# Patient Record
Sex: MALE | URBAN - METROPOLITAN AREA
[De-identification: names, ages, dates, MRNs, and addresses within clinical notes are randomized per-mention and may not be internally consistent; named-entity substitution may affect disease eponyms.]

---

## 2024-08-09 ENCOUNTER — APPOINTMENT (RX ONLY)
Dept: URBAN - METROPOLITAN AREA CLINIC 42 | Facility: CLINIC | Age: 29
Setting detail: DERMATOLOGY
End: 2024-08-09

## 2024-08-09 DIAGNOSIS — L30.4 ERYTHEMA INTERTRIGO: ICD-10-CM | Status: INADEQUATELY CONTROLLED

## 2024-08-09 DIAGNOSIS — B08.1 MOLLUSCUM CONTAGIOSUM: ICD-10-CM

## 2024-08-09 DIAGNOSIS — R23.8 OTHER SKIN CHANGES: ICD-10-CM

## 2024-08-09 DIAGNOSIS — L82.0 INFLAMED SEBORRHEIC KERATOSIS: ICD-10-CM

## 2024-08-09 PROCEDURE — 99203 OFFICE O/P NEW LOW 30 MIN: CPT | Mod: 25

## 2024-08-09 PROCEDURE — ? COUNSELING

## 2024-08-09 PROCEDURE — ? PATIENT SPECIFIC COUNSELING

## 2024-08-09 PROCEDURE — ? OTC TREATMENT REGIMEN

## 2024-08-09 PROCEDURE — 17110 DESTRUCTION B9 LES UP TO 14: CPT

## 2024-08-09 PROCEDURE — ? LIQUID NITROGEN

## 2024-08-09 ASSESSMENT — LOCATION DETAILED DESCRIPTION DERM
LOCATION DETAILED: SUPRAPUBIC SKIN
LOCATION DETAILED: LEFT LATERAL BASE OF DORSAL PENILE SHAFT
LOCATION DETAILED: LEFT LATERAL MID-DORSAL PENILE SHAFT
LOCATION DETAILED: BASE OF DORSAL PENILE SHAFT
LOCATION DETAILED: RIGHT LATERAL BASE OF DORSAL PENILE SHAFT
LOCATION DETAILED: RIGHT LATERAL TEMPLE
LOCATION DETAILED: RIGHT LATERAL MID-DORSAL PENILE SHAFT

## 2024-08-09 ASSESSMENT — BSA RASH: BSA RASH: 5

## 2024-08-09 ASSESSMENT — TOTAL NUMBER OF MOLLUSCUM CONAGIOSUM: # OF LESIONS?: 12

## 2024-08-09 ASSESSMENT — LOCATION SIMPLE DESCRIPTION DERM
LOCATION SIMPLE: RIGHT TEMPLE
LOCATION SIMPLE: GROIN
LOCATION SIMPLE: LEFT PENIS
LOCATION SIMPLE: DORSAL PENIS

## 2024-08-09 ASSESSMENT — LOCATION ZONE DERM
LOCATION ZONE: TRUNK
LOCATION ZONE: PENIS
LOCATION ZONE: FACE

## 2024-08-09 ASSESSMENT — SEVERITY ASSESSMENT: SEVERITY: MILD

## 2024-08-09 ASSESSMENT — PAIN INTENSITY VAS: HOW INTENSE IS YOUR PAIN 0 BEING NO PAIN, 10 BEING THE MOST SEVERE PAIN POSSIBLE?: NO PAIN

## 2024-08-09 NOTE — PROCEDURE: PATIENT SPECIFIC COUNSELING
Detail Level: Simple
Declines Hx of autoimmune disease, HIV and HPV. I have recommended updated STD testing

## 2024-08-09 NOTE — PROCEDURE: OTC TREATMENT REGIMEN
Patient Specific Otc Recommendations (Will Not Stick From Patient To Patient): Patient reports had lost weight and has now experienced redness to the areas.\\nGoldbond to affected areas abdomen as needed for flares.\\nDestin cream apply once daily to the abdomen.
Detail Level: Zone

## 2024-08-09 NOTE — HPI: BUMPS
How Severe Are Your Bumps?: mild
Have Your Bumps Been Treated?: been treated
Is This A New Presentation, Or A Follow-Up?: Bumps
When Was It Treated?: 7/2024

## 2024-08-28 ENCOUNTER — APPOINTMENT (RX ONLY)
Dept: URBAN - METROPOLITAN AREA CLINIC 42 | Facility: CLINIC | Age: 29
Setting detail: DERMATOLOGY
End: 2024-08-28

## 2024-08-28 DIAGNOSIS — B08.1 MOLLUSCUM CONTAGIOSUM: ICD-10-CM

## 2024-08-28 DIAGNOSIS — R23.8 OTHER SKIN CHANGES: ICD-10-CM

## 2024-08-28 PROCEDURE — ? COUNSELING

## 2024-08-28 PROCEDURE — ? PATIENT SPECIFIC COUNSELING

## 2024-08-28 PROCEDURE — 17111 DESTRUCTION B9 LESIONS 15/>: CPT

## 2024-08-28 PROCEDURE — ? LIQUID NITROGEN

## 2024-08-28 PROCEDURE — 99213 OFFICE O/P EST LOW 20 MIN: CPT | Mod: 25

## 2024-08-28 ASSESSMENT — LOCATION DETAILED DESCRIPTION DERM
LOCATION DETAILED: LEFT LATERAL BASE OF DORSAL PENILE SHAFT
LOCATION DETAILED: LEFT LATERAL MID-DORSAL PENILE SHAFT
LOCATION DETAILED: BASE OF DORSAL PENILE SHAFT
LOCATION DETAILED: RIGHT PENIS BASE
LOCATION DETAILED: RIGHT LATERAL BASE OF DORSAL PENILE SHAFT
LOCATION DETAILED: LEFT PENIS BASE
LOCATION DETAILED: RIGHT LATERAL MID-DORSAL PENILE SHAFT
LOCATION DETAILED: MID-DORSAL PENILE SHAFT

## 2024-08-28 ASSESSMENT — LOCATION ZONE DERM: LOCATION ZONE: PENIS

## 2024-08-28 ASSESSMENT — LOCATION SIMPLE DESCRIPTION DERM
LOCATION SIMPLE: DORSAL PENIS
LOCATION SIMPLE: LEFT PENIS
LOCATION SIMPLE: RIGHT PENIS

## 2024-12-06 ENCOUNTER — APPOINTMENT (OUTPATIENT)
Dept: URBAN - METROPOLITAN AREA CLINIC 42 | Facility: CLINIC | Age: 29
Setting detail: DERMATOLOGY
End: 2024-12-06

## 2024-12-06 DIAGNOSIS — B08.1 MOLLUSCUM CONTAGIOSUM: ICD-10-CM

## 2024-12-06 DIAGNOSIS — R23.8 OTHER SKIN CHANGES: ICD-10-CM

## 2024-12-06 PROCEDURE — ? COUNSELING

## 2024-12-06 PROCEDURE — 17111 DESTRUCTION B9 LESIONS 15/>: CPT

## 2024-12-06 PROCEDURE — ? LIQUID NITROGEN

## 2024-12-06 PROCEDURE — 99213 OFFICE O/P EST LOW 20 MIN: CPT | Mod: 25

## 2024-12-06 PROCEDURE — ? PATIENT SPECIFIC COUNSELING

## 2024-12-06 ASSESSMENT — LOCATION SIMPLE DESCRIPTION DERM
LOCATION SIMPLE: LEFT SCROTUM
LOCATION SIMPLE: RIGHT PENIS
LOCATION SIMPLE: DORSAL PENIS
LOCATION SIMPLE: RIGHT SUPRAPUBIC REGION
LOCATION SIMPLE: LEFT PENIS
LOCATION SIMPLE: LEFT SUPRAPUBIC REGION

## 2024-12-06 ASSESSMENT — LOCATION ZONE DERM
LOCATION ZONE: TRUNK
LOCATION ZONE: SCROTUM
LOCATION ZONE: PENIS

## 2024-12-06 ASSESSMENT — LOCATION DETAILED DESCRIPTION DERM
LOCATION DETAILED: LEFT PENIS BASE
LOCATION DETAILED: RIGHT PENIS BASE
LOCATION DETAILED: MID-DORSAL PENILE SHAFT
LOCATION DETAILED: LEFT SUPERIOR MEDIAL SCROTUM
LOCATION DETAILED: LEFT LATERAL MID-DORSAL PENILE SHAFT
LOCATION DETAILED: LEFT SUPRAPUBIC REGION
LOCATION DETAILED: BASE OF DORSAL PENILE SHAFT
LOCATION DETAILED: LEFT LATERAL BASE OF DORSAL PENILE SHAFT
LOCATION DETAILED: RIGHT LATERAL BASE OF DORSAL PENILE SHAFT
LOCATION DETAILED: RIGHT SUPRAPUBIC REGION